# Patient Record
Sex: MALE | Race: ASIAN
[De-identification: names, ages, dates, MRNs, and addresses within clinical notes are randomized per-mention and may not be internally consistent; named-entity substitution may affect disease eponyms.]

---

## 2017-04-23 ENCOUNTER — HOSPITAL ENCOUNTER (EMERGENCY)
Dept: HOSPITAL 72 - EMR | Age: 47
Discharge: HOME | End: 2017-04-23
Payer: SELF-PAY

## 2017-04-23 VITALS — WEIGHT: 260 LBS | HEIGHT: 69 IN | BODY MASS INDEX: 38.51 KG/M2

## 2017-04-23 VITALS — SYSTOLIC BLOOD PRESSURE: 106 MMHG | DIASTOLIC BLOOD PRESSURE: 62 MMHG

## 2017-04-23 VITALS — DIASTOLIC BLOOD PRESSURE: 78 MMHG | SYSTOLIC BLOOD PRESSURE: 119 MMHG

## 2017-04-23 DIAGNOSIS — R00.2: Primary | ICD-10-CM

## 2017-04-23 DIAGNOSIS — R07.9: ICD-10-CM

## 2017-04-23 DIAGNOSIS — R53.1: ICD-10-CM

## 2017-04-23 LAB
ALBUMIN/GLOB SERPL: 1.6 {RATIO} (ref 1–2.7)
ALT SERPL-CCNC: 27 U/L (ref 3–41)
ANION GAP SERPL CALC-SCNC: 15 MMOL/L (ref 5–15)
AST SERPL-CCNC: 17 U/L (ref 5–40)
BASOPHILS NFR BLD AUTO: 1.6 % (ref 0–2)
CALCIUM SERPL-MCNC: 9.3 MG/DL (ref 8.6–10.2)
CHLORIDE SERPL-SCNC: 98 MEQ/L (ref 98–107)
CK MB SERPL-MCNC: 3 NG/ML (ref ?–6.7)
CO2 SERPL-SCNC: 26 MEQ/L (ref 20–30)
CREAT SERPL-MCNC: 0.9 MG/DL (ref 0.7–1.2)
EOSINOPHIL NFR BLD AUTO: 4.3 % (ref 0–3)
ERYTHROCYTE [DISTWIDTH] IN BLOOD BY AUTOMATED COUNT: 12.8 % (ref 11.6–14.8)
GFR SERPLBLD BASED ON 1.73 SQ M-ARVRAT: > 60 ML/MIN (ref 60–?)
GLOBULIN SER-MCNC: 2.6 G/DL
HEMOLYSIS: 22
LIPASE SERPL-CCNC: 26 U/L (ref ?–60)
LYMPHOCYTES NFR BLD AUTO: 47 % (ref 20–45)
MCH RBC QN AUTO: 29.5 PG (ref 27–31)
MCHC RBC AUTO-ENTMCNC: 32.6 G/DL (ref 32–36)
MCV RBC AUTO: 90 FL (ref 80–99)
MONOCYTES NFR BLD AUTO: 5.8 % (ref 1–10)
NEUTROPHILS NFR BLD AUTO: 41.4 % (ref 45–75)
PLATELET # BLD: 379 K/UL (ref 150–450)
PMV BLD AUTO: 6.2 FL (ref 6.5–10.1)
POTASSIUM SERPL-SCNC: 3.4 MEQ/L (ref 3.4–4.9)
PROT SERPL-MCNC: 7 G/DL (ref 6.6–8.7)
RBC # BLD AUTO: 5.74 M/UL (ref 4.7–6.1)
SODIUM SERPL-SCNC: 139 MEQ/L (ref 135–145)
TROPONIN I SERPL-MCNC: < 0.3 NG/ML (ref ?–0.3)
WBC # BLD AUTO: 5.1 K/UL (ref 4.8–10.8)

## 2017-04-23 PROCEDURE — 36415 COLL VENOUS BLD VENIPUNCTURE: CPT

## 2017-04-23 PROCEDURE — 84484 ASSAY OF TROPONIN QUANT: CPT

## 2017-04-23 PROCEDURE — 83690 ASSAY OF LIPASE: CPT

## 2017-04-23 PROCEDURE — 80300: CPT

## 2017-04-23 PROCEDURE — 82550 ASSAY OF CK (CPK): CPT

## 2017-04-23 PROCEDURE — 71010: CPT

## 2017-04-23 PROCEDURE — 99284 EMERGENCY DEPT VISIT MOD MDM: CPT

## 2017-04-23 PROCEDURE — 80053 COMPREHEN METABOLIC PANEL: CPT

## 2017-04-23 PROCEDURE — 93005 ELECTROCARDIOGRAM TRACING: CPT

## 2017-04-23 PROCEDURE — 85025 COMPLETE CBC W/AUTO DIFF WBC: CPT

## 2017-04-23 PROCEDURE — 82553 CREATINE MB FRACTION: CPT

## 2017-04-23 NOTE — EMERGENCY ROOM REPORT
History of Present Illness


General


Chief Complaint:  General Complaint


Source:  Patient





Present Illness


HPI


Patient present with an episode of acute shortness of breath


This occurred this morning after eating breakfast


Patient reports that he felt like he was having a heart attack





Felt weak


Palpitations





Denies any headache or visual changes


Denies any chest pain at this time denies any back or flank pain


Patient has been on multiple supplements


He had borderline diabetes which is controlled with his diet





Denies any recent travel denies any pleurisy


Allergies:  


Coded Allergies:  


     No Known Allergies (Unverified , 4/23/17)





Patient History


Past Medical History:  see triage record


Pertinent Family History:  none


Reviewed Nursing Documentation:  PMH: Agreed, PSxH: Agreed





Nursing Documentation-PMH


Past Medical History:  No Stated History





Review of Systems


All Other Systems:  negative except mentioned in HPI





Physical Exam





Vital Signs








  Date Time  Temp Pulse Resp B/P Pulse Ox O2 Delivery O2 Flow Rate FiO2


 


4/23/17 10:27 98.1 106 16 125/70 98 Room Air  


 


4/23/17 10:47       2.0 








Sp02 EP Interpretation:  reviewed, normal


General Appearance:  well appearing, no apparent distress


Head:  normocephalic, atraumatic


Eyes:  bilateral eye EOMI, bilateral eye PERRL


ENT:  hearing grossly normal, normal pharynx, TMs + canals normal, uvula midline


Neck:  full range of motion, supple, no meningismus, no bony tend


Respiratory:  lungs clear, normal breath sounds, no rhonchi, no respiratory 

distress, no retraction, no accessory muscle use


Cardiovascular #1:  normal peripheral pulses, regular rate, rhythm, no edema, 

no gallop, no JVD, no murmur


Gastrointestinal:  normal bowel sounds, non tender, soft, no mass, no 

organomegaly, non-distended, no guarding, no hernia, no pulsatile mass, no 

rebound


Genitourinary:  no CVA tenderness


Musculoskeletal:  normal inspection


Neurologic:  oriented x3, responsive, CNs III-XII nml as tested, motor strength/

tone normal, sensory intact


Psychiatric:  mood/affect normal


Skin:  normal color, no rash, warm/dry, palpation normal


Lymphatic:  normal inspection, no adenopathy





Medical Decision Making


Diagnostic Impression:  


 Primary Impression:  


 Palpitations


 Additional Impressions:  


 Weakness


 Chest pain


ER Course


Patient is a fairly complex patient with multiple differential to consideration 

including but not limited to cardiac cardiopulmonary and vascular emergencies





Patient's EKG and blood work are appropriate


EKG was repeated again which was normal patient remains asymptomatic at this 

time





and is stable for close outpatient followup





Labs








Test


  4/23/17


10:35 4/23/17


11:11


 


White Blood Count


  5.1 K/UL


(4.8-10.8) 


 


 


Red Blood Count


  5.74 M/UL


(4.70-6.10) 


 


 


Hemoglobin


  17.0 G/DL


(14.2-18.0) 


 


 


Hematocrit


  51.9 %


(42.0-52.0) 


 


 


Mean Corpuscular Volume 90 FL (80-99)  


 


Mean Corpuscular Hemoglobin


  29.5 PG


(27.0-31.0) 


 


 


Mean Corpuscular Hemoglobin


Concent 32.6 G/DL


(32.0-36.0) 


 


 


Red Cell Distribution Width


  12.8 %


(11.6-14.8) 


 


 


Platelet Count


  379 K/UL


(150-450) 


 


 


Mean Platelet Volume


  6.2 FL


(6.5-10.1) 


 


 


Neutrophils (%) (Auto)


  41.4 %


(45.0-75.0) 


 


 


Lymphocytes (%) (Auto)


  47.0 %


(20.0-45.0) 


 


 


Monocytes (%) (Auto)


  5.8 %


(1.0-10.0) 


 


 


Eosinophils (%) (Auto)


  4.3 %


(0.0-3.0) 


 


 


Basophils (%) (Auto)


  1.6 %


(0.0-2.0) 


 


 


Sodium Level


  139 mEQ/L


(135-145) 


 


 


Potassium Level


  3.4 mEQ/L


(3.4-4.9) 


 


 


Chloride Level


  98 mEQ/L


() 


 


 


Carbon Dioxide Level


  26 mEQ/L


(20-30) 


 


 


Anion Gap 15 (5-15)  


 


Blood Urea Nitrogen


  15 mg/dL


(7-23) 


 


 


Creatinine


  0.9 mg/dL


(0.7-1.2) 


 


 


Estimat Glomerular Filtration


Rate > 60 mL/min


(>60) 


 


 


Glucose Level


  151 mg/dL


() 


 


 


Calcium Level


  9.3 mg/dL


(8.6-10.2) 


 


 


Total Bilirubin


  0.5 mg/dL


(0.0-1.2) 


 


 


Aspartate Amino Transf


(AST/SGOT) 17 U/L (5-40) 


  


 


 


Alanine Aminotransferase


(ALT/SGPT) 27 U/L (3-41) 


  


 


 


Alkaline Phosphatase


  47 U/L


() 


 


 


Total Creatine Kinase


  254 U/L


() 


 


 


Creatine Kinase MB


  3.0 ng/mL (<


6.7) 


 


 


Creatine Kinase MB Relative


Index 1.1 


  


 


 


Troponin I


  < 0.30 ng/mL


(<=0.30) 


 


 


Total Protein


  7.0 g/dL


(6.6-8.7) 


 


 


Albumin


  4.4 g/dL


(3.5-5.2) 


 


 


Globulin 2.6 g/dL  


 


Albumin/Globulin Ratio 1.6 (1.0-2.7)  


 


Lipase 26 U/L (< 60)  


 


Urine Opiates Screen


  


  Negative


(NEGATIVE)


 


Urine Barbiturates Screen


  


  Negative


(NEGATIVE)


 


Phencyclidine (PCP) Screen


  


  Negative


(NEGATIVE)


 


Urine Amphetamines Screen


  


  Negative


(NEGATIVE)


 


Urine Benzodiazepines Screen


  


  Negative


(NEGATIVE)


 


Urine Cocaine Screen


  


  Negative


(NEGATIVE)


 


Urine Marijuana (THC) Screen


  


  Negative


(NEGATIVE)








EKG Diagnostic Results


Rate:  normal


Rhythm:  NSR


ST Segments:  no acute changes





Rhythm Strip Diag. Results


EP Interpretation:  yes


Rate:  66


Rhythm:  NSR, no PVC's, no ectopy





Chest X-Ray Diagnostic Results


EP Interpretation:  Yes


Findings:  no consolidation, no effusion, no pneumothorax


Number of Views:  1





Last Vital Signs








  Date Time  Temp Pulse Resp B/P Pulse Ox O2 Delivery O2 Flow Rate FiO2


 


4/23/17 10:47 97.6 95 17 119/78 97 Nasal Cannula 2.0 








Status:  improved


Disposition:  HOME, SELF-CARE


Condition:  Improved





Additional Instructions:  


Patient is provided with the discharge instructions notified to follow up with 

primary doctor in the next 2-3 days otherwise return to the er with any 

worsening symptoms.


Please note that this report is being documented using DRAGON technology.  This 

can lead to erroneous entry secondary to incorrect interpretation by the 

dictating instrument.











EFREM KENNEDY D.O. Apr 23, 2017 10:50

## 2017-04-24 NOTE — DIAGNOSTIC IMAGING REPORT
Indication: Chest pain



Technique:  Single portable AP view of the chest.



Findings:



Comparison:  None.



Right hemidiaphragm is elevated. Mild calcification of aortic arch. The bones and

extra pulmonary soft tissues, cardiomediastinal silhouette, pulmonary vasculature

and parenchyma, and pleural surfaces are otherwise unremarkable.



IMPRESSION:



Elevation of right hemidiaphragm, nonspecific, acuity indeterminate



Aortosclerosis



Otherwise negative portable AP chest.

## 2018-11-21 ENCOUNTER — HOSPITAL ENCOUNTER (EMERGENCY)
Dept: HOSPITAL 72 - EMR | Age: 48
Discharge: HOME | End: 2018-11-21
Payer: SELF-PAY

## 2018-11-21 VITALS — SYSTOLIC BLOOD PRESSURE: 115 MMHG | DIASTOLIC BLOOD PRESSURE: 68 MMHG

## 2018-11-21 VITALS — DIASTOLIC BLOOD PRESSURE: 64 MMHG | SYSTOLIC BLOOD PRESSURE: 109 MMHG

## 2018-11-21 VITALS — DIASTOLIC BLOOD PRESSURE: 70 MMHG | SYSTOLIC BLOOD PRESSURE: 111 MMHG

## 2018-11-21 VITALS — HEIGHT: 69 IN | WEIGHT: 215 LBS | BODY MASS INDEX: 31.84 KG/M2

## 2018-11-21 VITALS — DIASTOLIC BLOOD PRESSURE: 1 MMHG | SYSTOLIC BLOOD PRESSURE: 115 MMHG

## 2018-11-21 DIAGNOSIS — Z87.891: ICD-10-CM

## 2018-11-21 DIAGNOSIS — K56.7: Primary | ICD-10-CM

## 2018-11-21 DIAGNOSIS — K76.0: ICD-10-CM

## 2018-11-21 DIAGNOSIS — E11.9: ICD-10-CM

## 2018-11-21 LAB
ADD MANUAL DIFF: NO
ALBUMIN SERPL-MCNC: 4.1 G/DL (ref 3.4–5)
ALBUMIN/GLOB SERPL: 1 {RATIO} (ref 1–2.7)
ALP SERPL-CCNC: 50 U/L (ref 46–116)
ALT SERPL-CCNC: 36 U/L (ref 12–78)
ANION GAP SERPL CALC-SCNC: 11 MMOL/L (ref 5–15)
APPEARANCE UR: CLEAR
APTT BLD: 27 SEC (ref 23–33)
APTT PPP: YELLOW S
AST SERPL-CCNC: 17 U/L (ref 15–37)
BILIRUB SERPL-MCNC: 0.6 MG/DL (ref 0.2–1)
BUN SERPL-MCNC: 17 MG/DL (ref 7–18)
CALCIUM SERPL-MCNC: 9.1 MG/DL (ref 8.5–10.1)
CHLORIDE SERPL-SCNC: 100 MMOL/L (ref 98–107)
CO2 SERPL-SCNC: 27 MMOL/L (ref 21–32)
CREAT SERPL-MCNC: 1 MG/DL (ref 0.55–1.3)
ERYTHROCYTE [DISTWIDTH] IN BLOOD BY AUTOMATED COUNT: 11.3 % (ref 11.6–14.8)
GLOBULIN SER-MCNC: 4.1 G/DL
GLUCOSE UR STRIP-MCNC: NEGATIVE MG/DL
HCT VFR BLD CALC: 50.7 % (ref 42–52)
HGB BLD-MCNC: 17 G/DL (ref 14.2–18)
INR PPP: 1 (ref 0.9–1.1)
KETONES UR QL STRIP: (no result)
LEUKOCYTE ESTERASE UR QL STRIP: NEGATIVE
MCV RBC AUTO: 85 FL (ref 80–99)
NITRITE UR QL STRIP: NEGATIVE
PH UR STRIP: 6 [PH] (ref 4.5–8)
PLATELET # BLD: 358 K/UL (ref 150–450)
POTASSIUM SERPL-SCNC: 3.3 MMOL/L (ref 3.5–5.1)
PROT UR QL STRIP: NEGATIVE
RBC # BLD AUTO: 5.95 M/UL (ref 4.7–6.1)
SODIUM SERPL-SCNC: 138 MMOL/L (ref 136–145)
SP GR UR STRIP: 1.01 (ref 1–1.03)
UROBILINOGEN UR-MCNC: NORMAL MG/DL (ref 0–1)
WBC # BLD AUTO: 14 K/UL (ref 4.8–10.8)

## 2018-11-21 PROCEDURE — 80053 COMPREHEN METABOLIC PANEL: CPT

## 2018-11-21 PROCEDURE — 74177 CT ABD & PELVIS W/CONTRAST: CPT

## 2018-11-21 PROCEDURE — 85610 PROTHROMBIN TIME: CPT

## 2018-11-21 PROCEDURE — 96374 THER/PROPH/DIAG INJ IV PUSH: CPT

## 2018-11-21 PROCEDURE — 93005 ELECTROCARDIOGRAM TRACING: CPT

## 2018-11-21 PROCEDURE — 99284 EMERGENCY DEPT VISIT MOD MDM: CPT

## 2018-11-21 PROCEDURE — 81003 URINALYSIS AUTO W/O SCOPE: CPT

## 2018-11-21 PROCEDURE — 36415 COLL VENOUS BLD VENIPUNCTURE: CPT

## 2018-11-21 PROCEDURE — 83690 ASSAY OF LIPASE: CPT

## 2018-11-21 PROCEDURE — 85730 THROMBOPLASTIN TIME PARTIAL: CPT

## 2018-11-21 PROCEDURE — 96361 HYDRATE IV INFUSION ADD-ON: CPT

## 2018-11-21 PROCEDURE — 84484 ASSAY OF TROPONIN QUANT: CPT

## 2018-11-21 PROCEDURE — 85025 COMPLETE CBC W/AUTO DIFF WBC: CPT

## 2018-11-21 NOTE — DIAGNOSTIC IMAGING REPORT
Clinical Indication: Abdominal pain for 3 days

 

Technique:   No oral contrast utilized, per emergency room physician request  IV

administration nonionic contrast. Venous phase spiral acquisition obtained through

the abdomen and pelvis. Multiplanar reconstructions were generated. Total dose length

product 954.74 mGycm. CTDIvol(s) 17.25 mGy. Dose reduction achieved using automated

exposure control

 

 

Comparison: none

 

Findings: There are dilated mid small bowel loops. Transition point probably in the

central lower abdomen anteriorly. These are fluid-filled. Distal small bowel loops

are nondilated.

 

The appendix is normal. No evidence of diverticulosis or diverticulitis. No free or

loculated intraperitoneal gas or fluid is evident. Distal esophagus demonstrates a

small sliding-type hiatal hernia. The stomach is otherwise normal. The duodenum is

unremarkable.

 

The liver is mildly hypoattenuating, consistent with fatty change. Is also focal

fatty infiltration adjacent to the falciform ligament. Multiple subcentimeter

low-attenuation lesions are seen in the dome of the liver the gallbladder, bile

ducts, pancreas are unremarkable. The spleen is unremarkable except for a small

accessory splenule. The adrenals and kidneys are unremarkable.

 

The included lung bases are clear. The bones demonstrate degenerative spondylosis

changes.

 

Impression: Dilated mid small bowel, with nondilated distal small bowel loops.

Transition is tapered rather than abrupt, favoring ileus or enteritis as etiology of

findings. However, the possibility of small bowel obstruction should also be

considered

 

Hypoattenuating liver, consistent with fatty change

 

Small hiatal hernia

 

Subcentimeter low-attenuation liver lesions, too small to characterize, most likely

benign simple cysts or bile hamartomas. No further follow-up necessary

 

This agrees with the preliminary interpretation provided overnight by [facility] [place]

teleradiology service.

 

The CT scanner at [facility] is accredited by the [employer] College of

Radiology and the scans are performed using protocols designed to limit radiation

exposure to as low as reasonably achievable to attain images of sufficient resolution

adequate for diagnostic evaluation.

## 2018-11-21 NOTE — EMERGENCY ROOM REPORT
History of Present Illness


General


Chief Complaint:  Abdominal Pain


Source:  Patient





Present Illness


HPI


Patient 48-year-old male presented after increased abdominal pain.  Patient 

reportedly had prior history of the diabetes.  Patient reports having increased 

epigastric pain associated with nausea and vomiting.  He reports being somewhat 

cold.  He denies any chest discomfort.  He reports having increased bloating 

sensation.  He reportedly had onset of symptoms after eating some coconut oil. 

The patient prior history of diet-controlled diabetes.


Allergies:  


Coded Allergies:  


     No Known Allergies (Unverified , 11/21/18)





Patient History


Past Medical History:  see triage record


Reviewed Nursing Documentation:  PMH: Agreed; PSxH: Agreed





Nursing Documentation-PMH


Past Medical History:  No Stated History





Review of Systems


All Other Systems:  negative except mentioned in HPI





Physical Exam





Vital Signs








  Date Time  Temp Pulse Resp B/P (MAP) Pulse Ox O2 Delivery O2 Flow Rate FiO2


 


11/21/18 00:33 98.6 98 15 109/64 97 Room Air  








Sp02 EP Interpretation:  reviewed, normal


General Appearance:  normal inspection, well appearing, no apparent distress, 

alert, GCS 15, non-toxic


Head:  atraumatic


ENT:  normal ENT inspection, hearing grossly normal, normal voice


Neck:  normal inspection, full range of motion, supple, no bony tend


Respiratory:  normal inspection, lungs clear, normal breath sounds, no 

respiratory distress, no retraction, no wheezing


Cardiovascular #1:  regular rate, rhythm, no edema


Gastrointestinal:  normal inspection, normal bowel sounds, non tender, soft, no 

guarding, no hernia


Genitourinary:  no CVA tenderness


Musculoskeletal:  normal inspection, back normal, normal range of motion


Neurologic:  normal inspection, alert, oriented x3, responsive, CNs III-XII nml 

as tested, speech normal


Psychiatric:  normal inspection, judgement/insight normal, mood/affect normal


Skin:  normal inspection, normal color, no rash





Medical Decision Making


Diagnostic Impression:  


 Primary Impression:  


 Ileus


ER Course


Patient presented for abdominal pain. Differential diagnoses included ischemic 

bowel, appendicitis, perforated viscus, abdominal aortic aneurysm, inferior 

myocardial infarction, viral gastroenteritis


Because of complexity of patient's case laboratory testing and imaging studies 

were ordered.


The laboratory studies showed elevated white blood count.  CT of the abdomen 

and pelvis read by radiology showed fatty liver with some dilated small bowel 

loops consistent with ileus.  The patient is alert in the emergency department 

stated he felt better and wanted to go home.  The patient reported having 

improvement after IV fluids.  The patient is advised to recheck with primary 

care physician in one to 2 days.  The patient given prescription for Zofran.





Labs








Test


  11/21/18


01:30 11/21/18


01:36


 


White Blood Count


  14.0 K/UL


(4.8-10.8) 


 


 


Red Blood Count


  5.95 M/UL


(4.70-6.10) 


 


 


Hemoglobin


  17.0 G/DL


(14.2-18.0) 


 


 


Hematocrit


  50.7 %


(42.0-52.0) 


 


 


Mean Corpuscular Volume 85 FL (80-99)  


 


Mean Corpuscular Hemoglobin


  28.6 PG


(27.0-31.0) 


 


 


Mean Corpuscular Hemoglobin


Concent 33.5 G/DL


(32.0-36.0) 


 


 


Red Cell Distribution Width


  11.3 %


(11.6-14.8) 


 


 


Platelet Count


  358 K/UL


(150-450) 


 


 


Mean Platelet Volume


  5.7 FL


(6.5-10.1) 


 


 


Neutrophils (%) (Auto)  % (45.0-75.0)  


 


Lymphocytes (%) (Auto)  % (20.0-45.0)  


 


Monocytes (%) (Auto)  % (1.0-10.0)  


 


Eosinophils (%) (Auto)  % (0.0-3.0)  


 


Basophils (%) (Auto)  % (0.0-2.0)  


 


Prothrombin Time


  10.7 SEC


(9.30-11.50) 


 


 


Prothromb Time International


Ratio 1.0 (0.9-1.1) 


  


 


 


Activated Partial


Thromboplast Time 27 SEC (23-33) 


  


 


 


Sodium Level


  138 MMOL/L


(136-145) 


 


 


Potassium Level


  3.3 MMOL/L


(3.5-5.1) 


 


 


Chloride Level


  100 MMOL/L


() 


 


 


Carbon Dioxide Level


  27 MMOL/L


(21-32) 


 


 


Anion Gap


  11 mmol/L


(5-15) 


 


 


Blood Urea Nitrogen


  17 mg/dL


(7-18) 


 


 


Creatinine


  1.0 MG/DL


(0.55-1.30) 


 


 


Estimat Glomerular Filtration


Rate > 60 mL/min


(>60) 


 


 


Glucose Level


  125 MG/DL


() 


 


 


Calcium Level


  9.1 MG/DL


(8.5-10.1) 


 


 


Total Bilirubin


  0.6 MG/DL


(0.2-1.0) 


 


 


Aspartate Amino Transf


(AST/SGOT) 17 U/L (15-37) 


  


 


 


Alanine Aminotransferase


(ALT/SGPT) 36 U/L (12-78) 


  


 


 


Alkaline Phosphatase


  50 U/L


() 


 


 


Troponin I


  0.007 ng/mL


(0.000-0.056) 


 


 


Total Protein


  8.2 G/DL


(6.4-8.2) 


 


 


Albumin


  4.1 G/DL


(3.4-5.0) 


 


 


Globulin 4.1 g/dL  


 


Albumin/Globulin Ratio 1.0 (1.0-2.7)  


 


Lipase


  121 U/L


() 


 


 


Urine Color  Yellow 


 


Urine Appearance  Clear 


 


Urine pH  6 (4.5-8.0) 


 


Urine Specific Gravity


  


  1.015


(1.005-1.035)


 


Urine Protein


  


  Negative


(NEGATIVE)


 


Urine Glucose (UA)


  


  Negative


(NEGATIVE)


 


Urine Ketones  2+ (NEGATIVE) 


 


Urine Blood


  


  Negative


(NEGATIVE)


 


Urine Nitrite


  


  Negative


(NEGATIVE)


 


Urine Bilirubin


  


  Negative


(NEGATIVE)


 


Urine Urobilinogen


  


  Normal MG/DL


(0.0-1.0)


 


Urine Leukocyte Esterase


  


  Negative


(NEGATIVE)











Last Vital Signs








  Date Time  Temp Pulse Resp B/P (MAP) Pulse Ox O2 Delivery O2 Flow Rate FiO2


 


11/21/18 03:45 98.1 85 18 115/68 99 Room Air  








Status:  improved


Disposition:  HOME, SELF-CARE


Condition:  Stable


Scripts


Ondansetron* (ZOFRAN*) 4 Mg Tablet


4 MG ORAL Q6H PRN for Nausea & Vomiting, #20 TAB


   Prov: Jose Garcia MD         11/21/18


Referrals:  


NOT APPLICABLE THIS PATIENT,RE (PCP)


Patient Instructions:  Abdominal Pain, Adult











Jose Garcia MD Nov 21, 2018 03:52